# Patient Record
Sex: MALE | Race: WHITE | URBAN - METROPOLITAN AREA
[De-identification: names, ages, dates, MRNs, and addresses within clinical notes are randomized per-mention and may not be internally consistent; named-entity substitution may affect disease eponyms.]

---

## 2017-07-10 ENCOUNTER — THERAPY VISIT (OUTPATIENT)
Dept: PHYSICAL THERAPY | Facility: CLINIC | Age: 72
End: 2017-07-10
Payer: MEDICARE

## 2017-07-10 DIAGNOSIS — M54.50 LUMBAGO: Primary | ICD-10-CM

## 2017-07-10 PROCEDURE — G8978 MOBILITY CURRENT STATUS: HCPCS | Mod: GP | Performed by: PHYSICAL THERAPIST

## 2017-07-10 PROCEDURE — 97110 THERAPEUTIC EXERCISES: CPT | Mod: GP | Performed by: PHYSICAL THERAPIST

## 2017-07-10 PROCEDURE — G8979 MOBILITY GOAL STATUS: HCPCS | Mod: GP | Performed by: PHYSICAL THERAPIST

## 2017-07-10 PROCEDURE — 97161 PT EVAL LOW COMPLEX 20 MIN: CPT | Mod: GP | Performed by: PHYSICAL THERAPIST

## 2017-07-10 NOTE — LETTER
"DEPARTMENT OF HEALTH AND HUMAN SERVICES  CENTERS FOR MEDICARE & MEDICAID SERVICES    PLAN/UPDATED PLAN OF PROGRESS FOR OUTPATIENT REHABILITATION    PATIENTS NAME:  Desmond Driver     : 1945    PROVIDER NUMBER:    [id number]    [id number]    PROVIDER NAME: [employer] [facility]    MEDICAL RECORD NUMBER: [id number]     START OF CARE DATE:  SOC Date: 07/10/17   TYPE:  PT    PRIMARY/TREATMENT DIAGNOSIS: (Pertinent Medical Diagnosis)  Lumbago    VISITS FROM START OF CARE:  Rxs Used: 1     [facility] Initial Evaluation    Subjective:    Patient is a 71 year old male presenting with rehab back hpi. The history is provided by the patient. No  was used. Desmond Driver is a 71 year old male with a lumbar condition. Condition occurred with: Insidious onset. Condition occurred: for unknown reasons.This is a recurrent condition. The patient is reporting chronic/recurrent low back pain. 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 States he was on his feet, walking and standing most of the work days. Reports right sided low back pain that would radiate down B buttock and legs, not to the feet. Currently not reporting pain; however, has not been active, not walking nor working out. Was attending PT in [place] in April and May, which was beneficial, focus was on stretching (both flexion and extension). Patient reports pain:  Lumbar spine right. Radiates to: Lower leg left and lower leg right. Pain is described as shooting and other (\"brief\") and is intermittent and reported as 1/10 and 8/10.  Associated symptoms: Loss of motion/stiffness and loss of strength. Pain is the same all the time. Symptoms are exacerbated by walking, standing and twisting and relieved by rest, other and ice (TENS machine). Since onset symptoms are gradually improving.  Special tests:X-ray (lumbar stenosis). Previous treatment " includes physical therapy.  There was moderate improvement following previous treatment. General health as reported by patient is good. Pertinent medical history includes: Cancer, high blood pressure, heart problems and sleep disorder/apnea. Medical allergies: no. Other surgeries include: Cancer surgery and orthopedic surgery (R NADIRA and L TKA). Current medications: Cardiac medication and high blood pressure medication.  Current occupation is Retired, works [place] [employer] tournaments. Primary job tasks include:  Prolonged standing and prolonged sitting.              PATIENTS NAME:  Desmond Driver   : 1945                Objective:  Standing Alignment:    Cervical/Thoracic:  Thoracic kyphosis increased  Lumbar:  Lordosis decr    Lumbar/SI Evaluation  ROM:  Arom wnl lumbar: RFIS: no change, RASHEEDA: decreased AROM, increased peripheral symptoms.  AROM Lumbar:   Flexion:        Midshins  Ext:                    15%   Side Bend:        Left:     Right:   Rotation:           Left:     Right:   Side Glide:        Left:     Right:       Strength: TrA Contraction: poor; Hip Ext B: 4/5  Lumbar Myotomes:    T12-L3 (Hip Flex):  Left: 5    Right: 4+  L2-4 (Quads):  Left:  5    Right:  5  L4 (Ankle DF):  Left:  5    Right:  5  L5 (Great Toe Ext): Left: 5    Right: 5   S1 (Toe Raise):  Left: 5    Right: 5  Neural Tension/Mobility:    Left side:SLR w/DF  negative.   Right side:   SLR w/DF  negative.   Lumbar Palpation:  normal  Lumbar Provocation:    Left negative with:  PROM hip  Right negative with:  PROM hip  Spinal Segmental Conclusions:   Level: Hypo noted at L1, L2, L3, L4 and L5    Assessment/Plan:    Patient is a 71 year old male with lumbar complaints.    Patient has the following significant findings with corresponding treatment plan.                Diagnosis 1:  Low back pain  Pain -  hot/cold therapy, manual therapy, education, directional preference exercise and home program  Decreased ROM/flexibility - manual therapy and  therapeutic exercise  Decreased joint mobility - manual therapy and therapeutic exercise  Decreased strength - therapeutic exercise and therapeutic activities  Impaired muscle performance - neuro re-education  Decreased function - therapeutic activities  Impaired posture - neuro re-education  Therapy Evaluation Codes:   1) History comprised of:   Personal factors that impact the plan of care:      Age, Past/current experiences and Time since onset of symptoms.    Comorbidity factors that impact the plan of care are:      Cancer, Heart problems, High blood pressure and Sleep disorder/apnea.     Medications impacting care: Cardiac, High blood pressure and cholestrol.      PATIENTS NAME:  Desmond Driver   : 1945  2) Examination of Body Systems comprised of:   Body structures and functions that impact the plan of care:      Lumbar spine.   Activity limitations that impact the plan of care are:      Lifting, Standing, Walking and Working.  3) Clinical presentation characteristics are:   Stable/Uncomplicated.  4) Decision-Making    Low complexity using standardized patient assessment instrument and/or measureable assessment of functional outcome.  Cumulative Therapy Evaluation is: Low complexity.  Previous and current functional limitations:  (See Goal Flow Sheet for this information)    Short term and Long term goals: (See Goal Flow Sheet for this information)  Communication ability:  Patient appears to be able to clearly communicate and understand verbal and written communication and follow directions correctly.  Treatment Explanation - The following has been discussed with the patient:   RX ordered/plan of care  Anticipated outcomes  Possible risks and side effects  This patient would benefit from PT intervention to resume normal activities.   Rehab potential is good.  Frequency:  1 X week, once daily  Duration:  for 6 weeks  Discharge Plan:  Achieve all LTG.  Independent in home treatment program.  Reach maximal  "therapeutic benefit.    Caregiver Signature/Credentials _____________________________ Date ________       Treating Provider: Mary Beth Salas, PT, DPT   I have reviewed and certified the need for these services and plan of treatment while under my care.      PHYSICIAN'S SIGNATURE:   _________________________________________  Date___________   Timoteo JUANITO Harley    Certification period:  Beginning of Cert date period: 07/10/17 to  End of Cert period date: 10/07/17     Functional Level Progress Report: Please see attached \"Goal Flow sheet for Functional level.\"    ____X____ Continue Services or       ________ DC Services                Service dates: From  SOC Date: 07/10/17 date to present                         "

## 2017-07-10 NOTE — PROGRESS NOTES
"[facility] Initial Evaluation    Subjective:    Patient is a 71 year old male presenting with rehab back hpi. The history is provided by the patient. No  was used.   Desmond Driver is a 71 year old male with a lumbar condition.  Condition occurred with:  Insidious onset.  Condition occurred: for unknown reasons.  This is a recurrent condition  The patient is reporting chronic/recurrent low back pain. 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 States he was on his feet, walking and standing most of the work days. Reports right sided low back pain that would radiate down B buttock and legs, not to the feet. Currently not reporting pain; however, has not been active, not walking nor working out.    Was attending PT in [place] in April and May, which was beneficial, focus was on stretching (both flexion and extension)..    Patient reports pain:  Lumbar spine right.  Radiates to:  Lower leg left and lower leg right.  Pain is described as shooting and other (\"brief\") and is intermittent and reported as 1/10 and 8/10.  Associated symptoms:  Loss of motion/stiffness and loss of strength. Pain is the same all the time.  Symptoms are exacerbated by walking, standing and twisting and relieved by rest, other and ice (TENS machine).  Since onset symptoms are gradually improving.  Special tests:  X-ray (lumbar stenosis).  Previous treatment includes physical therapy.  There was moderate improvement following previous treatment.  General health as reported by patient is good.  Pertinent medical history includes:  Cancer, high blood pressure, heart problems and sleep disorder/apnea.  Medical allergies: no.  Other surgeries include:  Cancer surgery and orthopedic surgery (R NADIRA and L TKA).  Current medications:  Cardiac medication and high blood pressure medication.  Current occupation is Retired, works [employer].    " Primary job tasks include:  Prolonged standing and prolonged sitting.                                Objective:    Standing Alignment:    Cervical/Thoracic:  Thoracic kyphosis increased    Lumbar:  Lordosis decr                           Lumbar/SI Evaluation  ROM:  Arom wnl lumbar: RFIS: no change, RASHEEDA: decreased AROM, increased peripheral symptoms.  AROM Lumbar:   Flexion:        Midshins  Ext:                    15%   Side Bend:        Left:     Right:   Rotation:           Left:     Right:   Side Glide:        Left:     Right:         Strength: TrA Contraction: poor; Hip Ext B: 4/5  Lumbar Myotomes:    T12-L3 (Hip Flex):  Left: 5    Right: 4+  L2-4 (Quads):  Left:  5    Right:  5  L4 (Ankle DF):  Left:  5    Right:  5  L5 (Great Toe Ext): Left: 5    Right: 5   S1 (Toe Raise):  Left: 5    Right: 5        Neural Tension/Mobility:      Left side:SLR w/DF  negative.     Right side:   SLR w/DF  negative.   Lumbar Palpation:  normal        Lumbar Provocation:      Left negative with:  PROM hip    Right negative with:  PROM hip  Spinal Segmental Conclusions:     Level: Hypo noted at L1, L2, L3, L4 and L5                                                   General     ROS    Assessment/Plan:      Patient is a 71 year old male with lumbar complaints.    Patient has the following significant findings with corresponding treatment plan.                Diagnosis 1:  Low back pain  Pain -  hot/cold therapy, manual therapy, education, directional preference exercise and home program  Decreased ROM/flexibility - manual therapy and therapeutic exercise  Decreased joint mobility - manual therapy and therapeutic exercise  Decreased strength - therapeutic exercise and therapeutic activities  Impaired muscle performance - neuro re-education  Decreased function - therapeutic activities  Impaired posture - neuro re-education    Therapy Evaluation Codes:   1) History comprised of:   Personal factors that impact the plan of care:       Age, Past/current experiences and Time since onset of symptoms.    Comorbidity factors that impact the plan of care are:      Cancer, Heart problems, High blood pressure and Sleep disorder/apnea.     Medications impacting care: Cardiac, High blood pressure and cholestrol.  2) Examination of Body Systems comprised of:   Body structures and functions that impact the plan of care:      Lumbar spine.   Activity limitations that impact the plan of care are:      Lifting, Standing, Walking and Working.  3) Clinical presentation characteristics are:   Stable/Uncomplicated.  4) Decision-Making    Low complexity using standardized patient assessment instrument and/or measureable assessment of functional outcome.  Cumulative Therapy Evaluation is: Low complexity.    Previous and current functional limitations:  (See Goal Flow Sheet for this information)    Short term and Long term goals: (See Goal Flow Sheet for this information)     Communication ability:  Patient appears to be able to clearly communicate and understand verbal and written communication and follow directions correctly.  Treatment Explanation - The following has been discussed with the patient:   RX ordered/plan of care  Anticipated outcomes  Possible risks and side effects  This patient would benefit from PT intervention to resume normal activities.   Rehab potential is good.    Frequency:  1 X week, once daily  Duration:  for 6 weeks  Discharge Plan:  Achieve all LTG.  Independent in home treatment program.  Reach maximal therapeutic benefit.    Please refer to the daily flowsheet for treatment today, total treatment time and time spent performing 1:1 timed codes.

## 2017-07-17 ENCOUNTER — THERAPY VISIT (OUTPATIENT)
Dept: PHYSICAL THERAPY | Facility: CLINIC | Age: 72
End: 2017-07-17
Payer: MEDICARE

## 2017-07-17 DIAGNOSIS — M54.50 LUMBAGO: ICD-10-CM

## 2017-07-17 PROCEDURE — 97112 NEUROMUSCULAR REEDUCATION: CPT | Mod: GP | Performed by: PHYSICAL THERAPIST

## 2017-07-17 PROCEDURE — 97110 THERAPEUTIC EXERCISES: CPT | Mod: GP | Performed by: PHYSICAL THERAPIST

## 2017-07-24 ENCOUNTER — THERAPY VISIT (OUTPATIENT)
Dept: PHYSICAL THERAPY | Facility: CLINIC | Age: 72
End: 2017-07-24
Payer: MEDICARE

## 2017-07-24 DIAGNOSIS — M54.50 LUMBAGO: ICD-10-CM

## 2017-07-24 PROCEDURE — 97110 THERAPEUTIC EXERCISES: CPT | Mod: GP | Performed by: PHYSICAL THERAPIST

## 2017-07-24 PROCEDURE — 97112 NEUROMUSCULAR REEDUCATION: CPT | Mod: GP | Performed by: PHYSICAL THERAPIST

## 2017-08-07 ENCOUNTER — THERAPY VISIT (OUTPATIENT)
Dept: PHYSICAL THERAPY | Facility: CLINIC | Age: 72
End: 2017-08-07
Payer: MEDICARE

## 2017-08-07 DIAGNOSIS — M54.50 LUMBAGO: ICD-10-CM

## 2017-08-07 PROCEDURE — 97110 THERAPEUTIC EXERCISES: CPT | Mod: GP | Performed by: PHYSICAL THERAPIST

## 2017-08-07 PROCEDURE — 97112 NEUROMUSCULAR REEDUCATION: CPT | Mod: GP | Performed by: PHYSICAL THERAPIST

## 2017-08-14 ENCOUNTER — THERAPY VISIT (OUTPATIENT)
Dept: PHYSICAL THERAPY | Facility: CLINIC | Age: 72
End: 2017-08-14
Payer: MEDICARE

## 2017-08-14 DIAGNOSIS — M54.50 LUMBAGO: ICD-10-CM

## 2017-08-14 PROCEDURE — 97112 NEUROMUSCULAR REEDUCATION: CPT | Mod: GP | Performed by: PHYSICAL THERAPIST

## 2017-08-14 PROCEDURE — 97110 THERAPEUTIC EXERCISES: CPT | Mod: GP | Performed by: PHYSICAL THERAPIST

## 2017-09-11 ENCOUNTER — THERAPY VISIT (OUTPATIENT)
Dept: PHYSICAL THERAPY | Facility: CLINIC | Age: 72
End: 2017-09-11
Payer: MEDICARE

## 2017-09-11 DIAGNOSIS — M54.50 LUMBAGO: ICD-10-CM

## 2017-09-11 PROCEDURE — G8980 MOBILITY D/C STATUS: HCPCS | Mod: GP | Performed by: PHYSICAL THERAPIST

## 2017-09-11 PROCEDURE — 97110 THERAPEUTIC EXERCISES: CPT | Mod: GP | Performed by: PHYSICAL THERAPIST

## 2017-09-11 PROCEDURE — G8979 MOBILITY GOAL STATUS: HCPCS | Mod: GP | Performed by: PHYSICAL THERAPIST

## 2017-09-11 PROCEDURE — 97112 NEUROMUSCULAR REEDUCATION: CPT | Mod: GP | Performed by: PHYSICAL THERAPIST

## 2017-09-11 NOTE — PROGRESS NOTES
"Subjective:    HPI  Oswestry Score: 8 %                 Objective:    System    Physical Exam    General     ROS    Assessment/Plan:      DISCHARGE REPORT    Progress reporting period is from 7/10/2017 to 9/11/2017.       SUBJECTIVE  Subjective changes noted by patient:  Reports his back has not been sore, hasn't noticed it. Still having \"shots\" down B hamstrings. Feels this pain more on days he works out, the pain is better if he does his stretches. Has only been working on the lumbar flexion stretches twice a day. Will be going to [place] on Saturday. Has not noticed any increase in pain while golfing or after.    Current pain level is 0/10.     Previous pain level was  0/10.   Changes in function:  Yes (See Goal flowsheet attached for changes in current functional level)  Adverse reaction to treatment or activity: None    OBJECTIVE  Changes noted in objective findings:  Yes,   Objective:   AROM Lumbar Flx: distal shins, Ext: 25%;   Lumbar Myotomes Hip Flx R: -5/5, L: 5/5, Knee Ext B: 5/5, DF B: 5/5, PF B: 5/5;   Strength Hip Abd R: -5/5, L: 5/5, Hip Ext B: -5/5;   Pelvic Control w/ Bridge March: good, no hip drop;   TrA Contraction: good     ASSESSMENT/PLAN  Updated problem list and treatment plan: Diagnosis 1:  Low back pain  Pain -  home program  Decreased ROM/flexibility - home program  Decreased strength - home program  Impaired muscle performance - home program  Decreased function - home program  STG/LTGs have been met or progress has been made towards goals:  Yes (See Goal flow sheet completed today.)  Assessment of Progress: The patient has met all of their long term goals.  Self Management Plans:  Patient is independent in a home treatment program.  I have re-evaluated this patient and find that the nature, scope, duration and intensity of the therapy is appropriate for the medical condition of the patient.  Desmond continues to require the following intervention to meet STG and LTG's:  PT intervention is " no longer required to meet STG/LTG.    Recommendations:  This patient is ready to be discharged from therapy and continue their home treatment program.    Please refer to the daily flowsheet for treatment today, total treatment time and time spent performing 1:1 timed codes.

## 2017-09-11 NOTE — LETTER
"[facility]  [address]  [address]  [place] [address]  [phone]    2017    Re: Desmond Driver   :   1945  MRN:  [id number]   REFERRING PHYSICIAN:   Timoteo Harley    [facility] [facility]    Date of Initial Evaluation: 7/10/17  Visits:  Rxs Used: 6  Reason for Referral:  Lumbago    EVALUATION SUMMARY    Assessment/Plan:      DISCHARGE REPORT  Progress reporting period is from 7/10/2017 to 2017.       SUBJECTIVE  Subjective changes noted by patient:  Reports his back has not been sore, hasn't noticed it. Still having \"shots\" down B hamstrings. Feels this pain more on days he works out, the pain is better if he does his stretches. Has only been working on the lumbar flexion stretches twice a day. Will be going to [place] on Saturday. Has not noticed any increase in pain while golfing or after.    Current pain level is 0/10.     Previous pain level was  0/10.   Changes in function:  Yes (See Goal flowsheet attached for changes in current functional level)  Adverse reaction to treatment or activity: None    OBJECTIVE  Changes noted in objective findings:  Yes,   Objective:   AROM Lumbar Flx: distal shins, Ext: 25%;   Lumbar Myotomes Hip Flx R: -5/5, L: 5/5, Knee Ext B: 5/5, DF B: 5/5, PF B: 5/5;   Strength Hip Abd R: -5/5, L: 5/5, Hip Ext B: -5/5;   Pelvic Control w/ Bridge March: good, no hip drop;   TrA Contraction: good     ASSESSMENT/PLAN  Updated problem list and treatment plan: Diagnosis 1:  Low back pain  Pain -  home program  Decreased ROM/flexibility - home program  Decreased strength - home program          Re: Desmond Driver   :   1945      Impaired muscle performance - home program  Decreased function - home program  STG/LTGs have been met or progress has been made towards goals:  Yes (See Goal flow sheet completed today.)  Assessment of Progress: The patient has met all of their long term goals.  Self " Management Plans:  Patient is independent in a home treatment program.  I have re-evaluated this patient and find that the nature, scope, duration and intensity of the therapy is appropriate for the medical condition of the patient.  Desmond continues to require the following intervention to meet STG and LTG's:  PT intervention is no longer required to meet STG/LTG.    Recommendations:  This patient is ready to be discharged from therapy and continue their home treatment program.          Thank you for your referral.    INQUIRIES  Therapist:____________________________________Mary Beth Salas PT, DPT   [facility] [address] [address]  Phone: [phone]  Fax: [phone]

## 2020-11-12 ENCOUNTER — RECORDS - HEALTHEAST (OUTPATIENT)
Dept: LAB | Facility: CLINIC | Age: 75
End: 2020-11-12

## 2020-11-12 LAB — COVID-19 ANTIBODY IGG: NEGATIVE
